# Patient Record
Sex: MALE | Race: WHITE | Employment: UNEMPLOYED | URBAN - METROPOLITAN AREA
[De-identification: names, ages, dates, MRNs, and addresses within clinical notes are randomized per-mention and may not be internally consistent; named-entity substitution may affect disease eponyms.]

---

## 2017-01-27 ENCOUNTER — HOSPITAL ENCOUNTER (EMERGENCY)
Age: 22
Discharge: HOME | End: 2017-01-27
Attending: EMERGENCY MEDICINE
Payer: COMMERCIAL

## 2017-01-27 ENCOUNTER — APPOINTMENT (OUTPATIENT)
Dept: GENERAL RADIOLOGY | Age: 22
End: 2017-01-27
Attending: EMERGENCY MEDICINE
Payer: COMMERCIAL

## 2017-01-27 VITALS
SYSTOLIC BLOOD PRESSURE: 151 MMHG | HEART RATE: 69 BPM | HEIGHT: 71 IN | BODY MASS INDEX: 27.65 KG/M2 | TEMPERATURE: 97.6 F | RESPIRATION RATE: 14 BRPM | WEIGHT: 197.53 LBS | DIASTOLIC BLOOD PRESSURE: 75 MMHG | OXYGEN SATURATION: 100 %

## 2017-01-27 DIAGNOSIS — M54.50 ACUTE BILATERAL LOW BACK PAIN WITHOUT SCIATICA: Primary | ICD-10-CM

## 2017-01-27 PROCEDURE — 99282 EMERGENCY DEPT VISIT SF MDM: CPT

## 2017-01-27 PROCEDURE — 72100 X-RAY EXAM L-S SPINE 2/3 VWS: CPT

## 2017-01-27 RX ORDER — ONDANSETRON 4 MG/1
4 TABLET, ORALLY DISINTEGRATING ORAL
Qty: 10 TAB | Refills: 0 | Status: SHIPPED | OUTPATIENT
Start: 2017-01-27

## 2017-01-27 RX ORDER — OXYCODONE AND ACETAMINOPHEN 5; 325 MG/1; MG/1
1 TABLET ORAL
Qty: 15 TAB | Refills: 0 | Status: SHIPPED | OUTPATIENT
Start: 2017-01-27

## 2017-01-27 RX ORDER — DIAZEPAM 5 MG/1
5 TABLET ORAL
Qty: 15 TAB | Refills: 0 | Status: SHIPPED | OUTPATIENT
Start: 2017-01-27

## 2017-01-27 NOTE — DISCHARGE INSTRUCTIONS
Back Pain, Emergency or Urgent Symptoms: Care Instructions  Your Care Instructions  Many people have back pain at one time or another. In most cases, pain gets better with self-care that includes over-the-counter pain medicine, ice, heat, and exercises. Unless you have symptoms of a severe injury or heart attack, you may be able to give yourself a few days before you call a doctor. But some back problems are very serious. Do not ignore symptoms that need to be checked right away. Follow-up care is a key part of your treatment and safety. Be sure to make and go to all appointments, and call your doctor if you are having problems. It's also a good idea to know your test results and keep a list of the medicines you take. How can you care for yourself at home? · Sit or lie in positions that are most comfortable and that reduce your pain. Try one of these positions when you lie down:  ¨ Lie on your back with your knees bent and supported by large pillows. ¨ Lie on the floor with your legs on the seat of a sofa or chair. [place] on your side with your knees and hips bent and a pillow between your legs. ¨ Lie on your stomach if it does not make pain worse. · Do not sit up in bed, and avoid soft couches and twisted positions. Bed rest can help relieve pain at first, but it delays healing. Avoid bed rest after the first day. · Change positions every 30 minutes. If you must sit for long periods of time, take breaks from sitting. Get up and walk around, or lie flat. · Try using a heating pad on a low or medium setting, for 15 to 20 minutes every 2 or 3 hours. Try a warm shower in place of one session with the heating pad. You can also buy single-use heat wraps that last up to 8 hours. You can also try ice or cold packs on your back for 10 to 20 minutes at a time, several times a day. (Put a thin cloth between the ice pack and your skin.) This reduces pain and makes it easier to be active and exercise.   · Take pain medicines exactly as directed. ¨ If the doctor gave you a prescription medicine for pain, take it as prescribed. ¨ If you are not taking a prescription pain medicine, ask your doctor if you can take an over-the-counter medicine. When should you call for help? Call 911 anytime you think you may need emergency care. For example, call if:  · You are unable to move a leg at all. · You have back pain with severe belly pain. · You have symptoms of a heart attack. These may include:  ¨ Chest pain or pressure, or a strange feeling in the chest.  ¨ Sweating. ¨ Shortness of breath. ¨ Nausea or vomiting. ¨ Pain, pressure, or a strange feeling in the back, neck, jaw, or upper belly or in one or both shoulders or arms. ¨ Lightheadedness or sudden weakness. ¨ A fast or irregular heartbeat. After you call 911, the  may tell you to chew 1 adult-strength or 2 to 4 low-dose aspirin. Wait for an ambulance. Do not try to drive yourself. Call your doctor now or seek immediate medical care if:  · You have new or worse symptoms in your arms, legs, chest, belly, or buttocks. Symptoms may include:  ¨ Numbness or tingling. ¨ Weakness. ¨ Pain. · You lose bladder or bowel control. · You have back pain and:  ¨ You have injured your back while lifting or doing some other activity. Call if the pain is severe, has not gone away after 1 or 2 days, and you cannot do your normal daily activities. ¨ You have had a back injury before that needed treatment. ¨ Your pain has lasted longer than 4 weeks. ¨ You have had weight loss you cannot explain. ¨ You are age 48 or older. ¨ You have cancer now or have had it before. Watch closely for changes in your health, and be sure to contact your doctor if you are not getting better as expected. Where can you learn more? Go to http://zeenat-rashawn.info/.   Enter V036 in the search box to learn more about \"Back Pain, Emergency or Urgent Symptoms: Care Instructions. \"  Current as of: May 27, 2016  Content Version: 11.1  © 5400-7649 [employer]. Care instructions adapted under license by [employer] (which disclaims liability or warranty for this information). If you have questions about a medical condition or this instruction, always ask your healthcare professional. [address] any warranty or liability for your use of this information. Low Back Pain: Exercises  Your Care Instructions  Here are some examples of typical rehabilitation exercises for your condition. Start each exercise slowly. Ease off the exercise if you start to have pain. Your doctor or physical therapist will tell you when you can start these exercises and which ones will work best for you. How to do the exercises  Press-up    1. Lie on your stomach, supporting your body with your forearms. 2. Press your elbows down into the floor to raise your upper back. As you do this, relax your stomach muscles and allow your back to arch without using your back muscles. As your press up, do not let your hips or pelvis come off the floor. 3. Hold for 15 to 30 seconds, then relax. 4. Repeat 2 to 4 times. Alternate arm and leg (bird dog) exercise    Note: Do this exercise slowly. Try to keep your body straight at all times, and do not let one hip drop lower than the other. 1. Start on the floor, on your hands and knees. 2. Tighten your belly muscles. 3. Raise one leg off the floor, and hold it straight out behind you. Be careful not to let your hip drop down, because that will twist your trunk. 4. Hold for about 6 seconds, then lower your leg and switch to the other leg. 5. Repeat 8 to 12 times on each leg. 6. Over time, work up to holding for 10 to 30 seconds each time. 7. If you feel stable and secure with your leg raised, try raising the opposite arm straight out in front of you at the same time. Knee-to-chest exercise    1.  Lie on your back with your knees bent and your feet flat on the floor. 2. Bring one knee to your chest, keeping the other foot flat on the floor (or keeping the other leg straight, whichever feels better on your lower back). 3. Keep your lower back pressed to the floor. Hold for at least 15 to 30 seconds. 4. Relax, and lower the knee to the starting position. 5. Repeat with the other leg. Repeat 2 to 4 times with each leg. 6. To get more stretch, put your other leg flat on the floor while pulling your knee to your chest.  Curl-ups    1. Lie on the floor on your back with your knees bent at a 90-degree angle. Your feet should be flat on the floor, about 12 inches from your buttocks. 2. Cross your arms over your chest. If this bothers your neck, try putting your hands behind your neck (not your head), with your elbows spread apart. 3. Slowly tighten your belly muscles and raise your shoulder blades off the floor. 4. Keep your head in line with your body, and do not press your chin to your chest.  5. Hold this position for 1 or 2 seconds, then slowly lower yourself back down to the floor. 6. Repeat 8 to 12 times. Pelvic tilt exercise    1. Lie on your back with your knees bent. 2. \"Brace\" your stomach. This means to tighten your muscles by pulling in and imagining your belly button moving toward your spine. You should feel like your back is pressing to the floor and your hips and pelvis are rocking back. 3. Hold for about 6 seconds while you breathe smoothly. 4. Repeat 8 to 12 times. Heel dig bridging    1. Lie on your back with both knees bent and your ankles bent so that only your heels are digging into the floor. Your knees should be bent about 90 degrees. 2. Then push your heels into the floor, squeeze your buttocks, and lift your hips off the floor until your shoulders, hips, and knees are all in a straight line.   3. Hold for about 6 seconds as you continue to breathe normally, and then slowly lower your hips back down to the floor and rest for up to 10 seconds. 4. Do 8 to 12 repetitions. Hamstring stretch in doorway    1. Lie on your back in a doorway, with one leg through the open door. 2. Slide your leg up the wall to straighten your knee. You should feel a gentle stretch down the back of your leg. 3. Hold the stretch for at least 15 to 30 seconds. Do not arch your back, point your toes, or bend either knee. Keep one heel touching the floor and the other heel touching the wall. 4. Repeat with your other leg. 5. Do 2 to 4 times for each leg. Hip flexor stretch    1. Kneel on the floor with one knee bent and one leg behind you. Place your forward knee over your foot. Keep your other knee touching the floor. 2. Slowly push your hips forward until you feel a stretch in the upper thigh of your rear leg. 3. Hold the stretch for at least 15 to 30 seconds. Repeat with your other leg. 4. Do 2 to 4 times on each side. Wall sit    1. Stand with your back 10 to 12 inches away from a wall. 2. Lean into the wall until your back is flat against it. 3. Slowly slide down until your knees are slightly bent, pressing your lower back into the wall. 4. Hold for about 6 seconds, then slide back up the wall. 5. Repeat 8 to 12 times. Follow-up care is a key part of your treatment and safety. Be sure to make and go to all appointments, and call your doctor if you are having problems. It's also a good idea to know your test results and keep a list of the medicines you take. Where can you learn more? Go to http://zeenat-rashawn.info/. Enter A461 in the search box to learn more about \"Low Back Pain: Exercises. \"  Current as of: May 23, 2016  Content Version: 11.1  © 0744-8465 [employer]. Care instructions adapted under license by [employer] (which disclaims liability or warranty for this information).  If you have questions about a medical condition or this instruction, always ask your healthcare professional. [address] any warranty or liability for your use of this information.

## 2017-01-27 NOTE — ED PROVIDER NOTES
HPI Comments: Viet Browning is a 24 y.o. male with no significant medical who presents ambulatory to [facility] ED with a chief complaint of non-radiating lower back pain since this morning secondary to feeling a pop while lifting weights at the gym this morning. Patient reports his father will be driving him home. Patient denies any significant history of back problems. Patient denies any urinary symptoms, urinary or fecal incontinence, cough, shortness of breath, or any other symptoms at this time. PCP: Debbie Ernst MD    There are no other complaints, changes or physical findings at this time. The history is provided by the patient. No past medical history on file. No past surgical history on file. No family history on file. Social History     Social History    Marital status: SINGLE     Spouse name: N/A    Number of children: N/A    Years of education: N/A     Occupational History    Not on file. Social History Main Topics    Smoking status: Never Smoker    Smokeless tobacco: Not on file    Alcohol use No    Drug use: Not on file    Sexual activity: Not on file     Other Topics Concern    Not on file     Social History Narrative    No narrative on file     ALLERGIES: Sulfa (sulfonamide antibiotics)    Review of Systems   Constitutional: Negative. Negative for chills and fever. HENT: Negative. Negative for congestion, rhinorrhea and sore throat. Eyes: Negative. Respiratory: Negative. Negative for cough and shortness of breath. Cardiovascular: Negative. Negative for chest pain and palpitations. Gastrointestinal: Negative. Negative for abdominal pain, diarrhea, nausea and vomiting. Endocrine: Negative. Genitourinary: Negative. Negative for difficulty urinating, dysuria, frequency, hematuria and urgency. Negative for incontinence   Musculoskeletal: Positive for back pain (lower). Negative for neck pain and neck stiffness. Skin: Negative.   Negative for rash and wound. Allergic/Immunologic: Negative. Neurological: Negative. Negative for dizziness and headaches. Hematological: Negative. Psychiatric/Behavioral: Negative. Negative for agitation and confusion. All other systems reviewed and are negative. Patient Vitals for the past 12 hrs:   Temp Pulse Resp BP SpO2   01/27/17 1642 97.6 °F (36.4 °C) 69 14 151/75 100 %      Physical Exam   Constitutional: He is oriented to person, place, and time. He appears well-developed and well-nourished. No distress. HENT:   Head: Normocephalic and atraumatic. Nose: Nose normal.   Mouth/Throat: Oropharynx is clear and moist. No oropharyngeal exudate. Eyes: Conjunctivae and EOM are normal. Right eye exhibits no discharge. Left eye exhibits no discharge. No scleral icterus. Neck: Normal range of motion. Neck supple. No JVD present. No tracheal deviation present. No thyromegaly present. Cardiovascular: Normal rate, regular rhythm and normal heart sounds. 2+ distal pulses   Pulmonary/Chest: Effort normal and breath sounds normal. No respiratory distress. He has no wheezes. Abdominal: Soft. There is no tenderness. Musculoskeletal: He exhibits no edema. Decreased active passive range of motion due to tenderness to para lumbar musculature  Negative straight leg raise   Lymphadenopathy:     He has no cervical adenopathy. Neurological: He is alert and oriented to person, place, and time. He exhibits normal muscle tone. Coordination normal.   NVI   Skin: Skin is warm and dry. He is not diaphoretic. Psychiatric: He has a normal mood and affect. His behavior is normal. Judgment normal.   Nursing note and vitals reviewed.        MDM  Number of Diagnoses or Management Options  Diagnosis management comments: DDx: Strain, spasm       Amount and/or Complexity of Data Reviewed  Tests in the radiology section of CPT®: ordered and reviewed  Review and summarize past medical records: yes    Patient Progress  Patient progress: stable      Procedures     Progress Note:  5:13 PM  Patient declining pain medication in the ED, stating he would prefer to  a prescription for home use. IMAGING RESULTS:  XR SPINE LUMB 2 OR 3 V   Final Result     CLINICAL HISTORY: Low back pain     INDICATION: Low back pain  FINDINGS:     Three views of the lumbar spine are obtained. The vertebral bodies are anatomically aligned.     Intervertebral disc heights are well-maintained. Visualized osseous structures are without evidence of fracture-dislocation. There is no significant soft tissue abnormality identified.     IMPRESSION  IMPRESSION:     No fracture. IMPRESSION:  1. Acute bilateral low back pain without sciatica        PLAN:  1. Current Discharge Medication List      START taking these medications    Details   oxyCODONE-acetaminophen (PERCOCET) 5-325 mg per tablet Take 1 Tab by mouth every six (6) hours as needed for Pain for up to 15 doses. Max Daily Amount: 4 Tabs. Qty: 15 Tab, Refills: 0      diazePAM (VALIUM) 5 mg tablet Take 1 Tab by mouth every eight (8) hours as needed (spasm) for up to 15 doses. Max Daily Amount: 15 mg.  Qty: 15 Tab, Refills: 0      ondansetron (ZOFRAN ODT) 4 mg disintegrating tablet Take 1 Tab by mouth every eight (8) hours as needed for Nausea for up to 10 doses. Qty: 10 Tab, Refills: 0         CONTINUE these medications which have NOT CHANGED    Details   desloratadine (CLARINEX) 5 mg tablet Take 5 mg by mouth daily.            2.   Follow-up Information     Follow up With Details Comments 315 Renae Shipman MD   [address] [facility] [address] [place] [id number] [phone]      Humza Ross MD  As needed [address]  [phone]      [facility] EMERGENCY DEPT  If symptoms worsen [address]  [address] [facility]  [phone]        Return to ED if worse     Discharge Note:  5:35 PM  The patient is ready for discharge. The patient's signs, symptoms, diagnosis, and discharge instructions have been discussed and the patient has conveyed their understanding. The patient is to follow up as recommended or return to the ER should their symptoms worsen. Plan has been discussed and the patient is in agreement. This note is prepared by Nicole Hale, acting as Scribe for Alka Fisher. KATIUSKA Fletcher: The scribe's documentation has been prepared under my direction and personally reviewed by me in its entirety. I confirm that the note above accurately reflects all work, treatment, procedures, and medical decision making performed by me.

## 2017-01-27 NOTE — LETTER
[address] 
[facility] EMERGENCY DEPT 
[address] [place] [address] [address] [phone] 
[phone] Work/School Note Date: 1/27/2017 To Whom It May concern: 
 
Soha Sherman was seen and treated today in the emergency room. He may return to sports once symptoms improved. Sincerely, Carolina Ricardo